# Patient Record
Sex: FEMALE | Race: WHITE | NOT HISPANIC OR LATINO | Employment: OTHER | URBAN - METROPOLITAN AREA
[De-identification: names, ages, dates, MRNs, and addresses within clinical notes are randomized per-mention and may not be internally consistent; named-entity substitution may affect disease eponyms.]

---

## 2024-07-25 ENCOUNTER — HOSPITAL ENCOUNTER (OUTPATIENT)
Facility: HOSPITAL | Age: 84
Discharge: HOME | End: 2024-07-25
Attending: EMERGENCY MEDICINE | Admitting: EMERGENCY MEDICINE
Payer: MEDICARE

## 2024-07-25 ENCOUNTER — APPOINTMENT (OUTPATIENT)
Dept: GENERAL RADIOLOGY | Facility: HOSPITAL | Age: 84
End: 2024-07-25
Payer: MEDICARE

## 2024-07-25 VITALS
RESPIRATION RATE: 18 BRPM | DIASTOLIC BLOOD PRESSURE: 78 MMHG | OXYGEN SATURATION: 98 % | HEIGHT: 59 IN | TEMPERATURE: 98.1 F | SYSTOLIC BLOOD PRESSURE: 124 MMHG | HEART RATE: 81 BPM | BODY MASS INDEX: 24.19 KG/M2 | WEIGHT: 120 LBS

## 2024-07-25 DIAGNOSIS — M54.50 ACUTE RIGHT-SIDED LOW BACK PAIN WITHOUT SCIATICA: ICD-10-CM

## 2024-07-25 DIAGNOSIS — G20.A1 PARKINSON'S DISEASE WITHOUT DYSKINESIA, UNSPECIFIED WHETHER MANIFESTATIONS FLUCTUATE: ICD-10-CM

## 2024-07-25 DIAGNOSIS — R29.6 FREQUENT FALLS: Primary | ICD-10-CM

## 2024-07-25 LAB
BILIRUB UR QL STRIP: NEGATIVE
CLARITY UR: ABNORMAL
COLOR UR: ABNORMAL
GLUCOSE UR STRIP-MCNC: NEGATIVE MG/DL
HGB UR QL STRIP.AUTO: NEGATIVE
KETONES UR QL STRIP: ABNORMAL
LEUKOCYTE ESTERASE UR QL STRIP.AUTO: NEGATIVE
NITRITE UR QL STRIP: NEGATIVE
PH UR STRIP.AUTO: 5.5 [PH] (ref 5–8)
PROT UR QL STRIP: NEGATIVE
SP GR UR STRIP: >=1.03 (ref 1–1.03)
UROBILINOGEN UR QL STRIP: ABNORMAL

## 2024-07-25 PROCEDURE — 73502 X-RAY EXAM HIP UNI 2-3 VIEWS: CPT

## 2024-07-25 PROCEDURE — 72110 X-RAY EXAM L-2 SPINE 4/>VWS: CPT

## 2024-07-25 PROCEDURE — 93005 ELECTROCARDIOGRAM TRACING: CPT | Performed by: NURSE PRACTITIONER

## 2024-07-25 PROCEDURE — 93010 ELECTROCARDIOGRAM REPORT: CPT | Performed by: INTERNAL MEDICINE

## 2024-07-25 PROCEDURE — 99204 OFFICE O/P NEW MOD 45 MIN: CPT | Performed by: NURSE PRACTITIONER

## 2024-07-25 PROCEDURE — G0463 HOSPITAL OUTPT CLINIC VISIT: HCPCS | Performed by: NURSE PRACTITIONER

## 2024-07-25 PROCEDURE — 81003 URINALYSIS AUTO W/O SCOPE: CPT | Performed by: NURSE PRACTITIONER

## 2024-07-25 RX ORDER — ATORVASTATIN CALCIUM 20 MG/1
20 TABLET, FILM COATED ORAL
COMMUNITY
Start: 2024-07-04

## 2024-07-25 RX ORDER — IPRATROPIUM BROMIDE 42 UG/1
2 SPRAY, METERED NASAL
COMMUNITY
Start: 2024-07-12 | End: 2025-07-12

## 2024-07-25 RX ORDER — ESCITALOPRAM OXALATE 10 MG/1
10 TABLET ORAL DAILY
COMMUNITY
Start: 2024-03-11

## 2024-07-25 RX ORDER — BROMFENAC SODIUM 0.81 MG/ML
SOLUTION/ DROPS OPHTHALMIC
COMMUNITY

## 2024-07-25 RX ORDER — CARBIDOPA AND LEVODOPA 25; 100 MG/1; MG/1
TABLET, EXTENDED RELEASE ORAL
COMMUNITY
Start: 2024-02-19

## 2024-07-25 RX ORDER — PYRITHIONE ZINC 1 G/ML
LOTION/SHAMPOO TOPICAL DAILY
COMMUNITY

## 2024-07-25 RX ORDER — ANTIOX #8/OM3/DHA/EPA/LUT/ZEAX 250-2.5 MG
1 CAPSULE ORAL
COMMUNITY

## 2024-07-25 NOTE — DISCHARGE INSTRUCTIONS
Wear the medical alert necklace    Use walker at home.     Low back imaging shows no fractures    Increase water    Return Precautions    Although you are being discharged from the ED today, I encourage you to return for worsening symptoms.  Things can, and do, change such that treatment at home with medication may not be adequate.      Specifically, return for any of the following:    Chest pain, shortness of breath, pain or nausea and vomiting not controlled by medications provided.    Please make a follow up with your Primary Care Provider for a blood pressure recheck.

## 2024-07-25 NOTE — [facility] PROVIDER NOTE
EMERGENCY DEPARTMENT ENCOUNTER    Room Number:  10/10  Date seen:  7/25/2024  Time seen: 17:35 EDT  PCP: Provider, No Known  Historian: patient, daughter    Discussed/obtained information from independent historians: lengthy discussion with patient's daughter while patient was in XR    HPI:  Chief complaint:right hip pain, frequent falls  A complete HPI/ROS/PMH/PSH/SH/FH are unobtainable due to: n/a  Context:Kayla Palm is a 84 y.o. female with history of Parkinson's and macular degeneration who presents to the ED with c/o several days of low back pain and right hip pain after a syncopal episode last Friday.  Patient was seen at her primary care the other day for follow-up for this.  She states the syncopal episode was preceded by lightheadedness.  She denies any chest pain, breathing difficulty, different than usual urinary incontinence (she uses a pessary) or loss of sensation in her legs.  She does have a walker at home but does not use it all of the time and she also has a cane.    External (non-ED) record review: I reviewed recent primary care office visit from 2 days ago where patient was seen for syncopal episode.  The syncopal episode occurred July 19 of this year.  It was preceded by lightheadedness and she felt it coming on.  Primary care states that she had some orthostatic hypotension tension per her orthostatics.  Her Parkinson's medication, Sinemet can cause orthostatic hypotension.  Patient declined Holter monitor    Chronic or social conditions impacting care: Pt with Parkinson's disease and Macular degeneration    ALLERGIES  Codeine    PAST MEDICAL HISTORY  Active Ambulatory Problems     Diagnosis Date Noted    No Active Ambulatory Problems     Resolved Ambulatory Problems     Diagnosis Date Noted    No Resolved Ambulatory Problems     Past Medical History:   Diagnosis Date    Macular degeneration     Parkinsons        PAST SURGICAL HISTORY  No past surgical history on file.    FAMILY  HISTORY  History reviewed. No pertinent family history.    SOCIAL HISTORY  Social History     Socioeconomic History    Marital status:        REVIEW OF SYSTEMS  Review of Systems    All systems reviewed and negative except for those discussed in HPI.     PHYSICAL EXAM    I have reviewed the triage vital signs and nursing notes.  Vitals:    07/25/24 1706   BP: 124/78   Pulse: 81   Resp: 18   Temp: 98.1 °F (36.7 °C)   SpO2: 98%     Physical Exam  Constitutional:           Comments: Right sided paraspinal back pain.     Bilateral knees with contusions and left knee with abrasion         GENERAL: not distressed, elderly  HENT: nares patent, mm moist  EYES: no scleral icterus, PERRL  NECK: no ROM limitations  CV: regular rhythm, regular rate  RESPIRATORY: normal effort, CTAB  ABDOMEN: soft  : deferred  MUSCULOSKELETAL: no deformity  NEURO: alert, moves all extremities, follows commands  SKIN: warm, dry    LAB RESULTS  Recent Results (from the past 24 hour(s))   ECG 12 Lead Syncope    Collection Time: 07/25/24  6:14 PM   Result Value Ref Range    QT Interval 374 ms    QTC Interval 421 ms   Urinalysis With Culture If Indicated - Urine, Clean Catch    Collection Time: 07/25/24  6:45 PM    Specimen: Urine, Clean Catch   Result Value Ref Range    Color, UA Dark Yellow (A) Yellow, Straw    Appearance, UA Slightly Cloudy (A) Clear    pH, UA 5.5 5.0 - 8.0    Specific Gravity, UA >=1.030 1.005 - 1.030    Glucose, UA Negative Negative    Ketones, UA Trace (A) Negative    Bilirubin, UA Negative Negative    Blood, UA Negative Negative    Protein, UA Negative Negative    Leuk Esterase, UA Negative Negative    Nitrite, UA Negative Negative    Urobilinogen, UA 0.2 E.U./dL 0.2 - 1.0 E.U./dL       Ordered the above labs and independently interpreted results.  My findings will be discussed in the ED course or medical decision making section below    RADIOLOGY RESULTS  XR Spine Lumbar Complete 4+VW    Result Date: 7/25/2024  XR  SPINE LUMBAR COMPLETE 4+VW-7/25/2024  HISTORY: Low back pain.  AP lateral and oblique views are submitted. There is mild to moderate lumbar scoliosis. There is narrowing of essentially all of the lumbar disc spaces most severe at L3-4 and L4-5. No fracture or subluxation is seen. Facet joints appear unremarkable. There is some aortic calcification.      1. Lumbar degenerative disease and scoliosis. 2. No fractures are seen.       XR Hip With or Without Pelvis 2 - 3 View Right    Result Date: 7/25/2024  XR HIP W OR WO PELVIS 2-3 VIEW RIGHT-7/25/2024  HISTORY: Right hip pain.  No fractures or other acute abnormalities are seen. Lower lumbar degenerative changes are seen. There appears to be a pessary device overlying the pelvis.         Ordered the above noted radiological studies.  Independently interpreted by me.  My findings will be discussed in the medical decision section below.     PROGRESS, DATA ANALYSIS, CONSULTS AND MEDICAL DECISION MAKING    Please note that this section constitutes my independent interpretation of clinical data including lab results, radiology, EKG's.  This constitutes my independent professional opinion regarding differential diagnosis and management of this patient.  It may include any factors such as history from outside sources, review of external records, social determinants of health, management of medications, response to those treatments, and discussions with other providers.    ED Course as of 07/25/24 1911   Thu Jul 25, 2024 1744 I viewed right hip/pelvis imaging in PACS.  My independent interpretation is no acute fracture or dislocation.  [EW]   1820 EKG          EKG time: 1814  Rhythm/Rate: 76, sinus rhythm artifact on tracing  P waves and SD: Normal SD, normal SABINE  QRS, axis: Normal QRS and axis  ST and T waves: No acute ST-T wave abnormalities    Interpreted Contemporaneously by me, independently viewed  No prior available for comparison   [EW]      ED Course User  Index  [EW] Siria [email or web address]     Orders placed during this visit:  Orders Placed This Encounter   Procedures    XR Hip With or Without Pelvis 2 - 3 View Right    XR Spine Lumbar Complete 4+VW    Urinalysis With Culture If Indicated - Urine, Clean Catch    ECG 12 Lead Syncope            Medical Decision Making  Problems Addressed:  Acute right-sided low back pain without sciatica: complicated acute illness or injury  Frequent falls: complicated acute illness or injury  Parkinson's disease without dyskinesia, unspecified whether manifestations fluctuate: complicated acute illness or injury    Amount and/or Complexity of Data Reviewed  Radiology: ordered.  ECG/medicine tests: ordered.    Patient presents with right-sided hip and lower back pain after a fall that she had on Friday at home.  Daughter reports she had a another fall earlier today.  Patient is able to ambulate.  I think her issues are compounded by her Parkinson's disease.  She has no leg length discrepancy.  X-ray of the right hip normal without any acute fracture.  Lumbar spine imaging obtained as well.  EKG reassuring.  I did talk to the patient about observation admission due to the syncope but she declined.  Patient has a history of another syncopal episode in March of this year.  She has already seen her primary care about this and syncope thought to be related to orthostatic hypotension per exam in his office.  She was orthostatic.  The Sinemet that she takes can contribute to orthostatic hypotension also I do not think she is drinking enough water.        DIAGNOSIS  Final diagnoses:   Frequent falls   Acute right-sided low back pain without sciatica   Parkinson's disease without dyskinesia, unspecified whether manifestations fluctuate          Medication List      No changes were made to your prescriptions during this visit.         FOLLOW-UP  Follow up with your Primary Care Provider next week              Latest Documented Vital Signs:  As  of 19:11 EDT  BP- 124/78 HR- 81 Temp- 98.1 °F (36.7 °C) O2 sat- 98%    Appropriate PPE utilized throughout this patient encounter to include mask, if indicated, per current protocol. Hand hygiene was performed before donning PPE and after removal when leaving the room.    Please note that portions of this were completed with a voice recognition program.     Note Disclaimer: At [facility], we believe that sharing information builds trust and better relationships. You are receiving this note because you are receiving care at [facility] or recently visited. It is possible you will see health information before a provider has talked with you about it. This kind of information can be easy to misunderstand. To help you fully understand what it means for your health, we urge you to discuss this note with your provider.

## 2024-07-26 LAB
QT INTERVAL: 374 MS
QTC INTERVAL: 421 MS